# Patient Record
(demographics unavailable — no encounter records)

---
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
## 2025-02-10 NOTE — CONSULT LETTER
[Dear  ___] : Dear  [unfilled], [Courtesy Letter:] : I had the pleasure of seeing your patient, [unfilled], in my office today. [Sincerely,] : Sincerely, [FreeTextEntry2] : Ronit Dunn  [address] [place] [id number] [FreeTextEntry3] : Tianna Guardado, MSN, [facility]-BC Nurse Practitioner Neurosurgery [address], [address]nd [place] [address]  Office: [phone]  Fax: [phone]

## 2025-02-10 NOTE — CONSULT LETTER
[Dear  ___] : Dear  [unfilled], [Courtesy Letter:] : I had the pleasure of seeing your patient, [unfilled], in my office today. [Sincerely,] : Sincerely, [FreeTextEntry2] : Ronit Dunn  [address] [place] [phone] [FreeTextEntry3] : Tianna Guardado, MSN, [facility]-BC Nurse Practitioner Neurosurgery [address], [address]nd [place] [address]  Office: [phone]  Fax: [phone]